# Patient Record
Sex: MALE | URBAN - METROPOLITAN AREA
[De-identification: names, ages, dates, MRNs, and addresses within clinical notes are randomized per-mention and may not be internally consistent; named-entity substitution may affect disease eponyms.]

---

## 2017-06-30 ENCOUNTER — TELEPHONE (OUTPATIENT)
Dept: NEUROLOGY | Age: 67
End: 2017-06-30

## 2017-07-07 ENCOUNTER — TELEPHONE (OUTPATIENT)
Dept: NEUROLOGY | Age: 67
End: 2017-07-07

## 2017-07-07 NOTE — TELEPHONE ENCOUNTER
----- Message from Soto Luna sent at 7/7/2017  3:57 PM EDT -----  Regarding: Dr. Pelon Hampton  Pt would like a return phone call from the nurse today regarding the medication dosage change. Pt has been waiting for two days to reach the nurse. Pt can be reached at [phone] or [phone].

## 2017-07-07 NOTE — TELEPHONE ENCOUNTER
I spoke with the patient and notified that Dr Zabrina Livingston has been in the hospital on call.  Message has been sent to the provider

## 2017-07-18 DIAGNOSIS — G25.81 RESTLESS LEG SYNDROME: Primary | ICD-10-CM

## 2017-07-18 RX ORDER — ROPINIROLE 4 MG/1
1 TABLET, FILM COATED, EXTENDED RELEASE ORAL
Qty: 30 TAB | Refills: 3 | Status: SHIPPED | OUTPATIENT
Start: 2017-07-18

## 2017-07-21 ENCOUNTER — TELEPHONE (OUTPATIENT)
Dept: NEUROLOGY | Age: 67
End: 2017-07-21

## 2017-07-21 NOTE — TELEPHONE ENCOUNTER
----- Message from Spenser Marte sent at 7/21/2017 12:11 PM EDT -----  Regarding: Dr. Nieves Meredith returned nurse call regarding medication change and would like a call back today. Best contact number [phone].

## 2017-07-21 NOTE — TELEPHONE ENCOUNTER
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 His best contact number is [phone].

## 2017-07-28 NOTE — TELEPHONE ENCOUNTER
Called the patient and spoke to him about the increase on the medication and advised the next step for the requip would be 4mg the patient understood and he is going to try the 4mg